# Patient Record
Sex: MALE | URBAN - METROPOLITAN AREA
[De-identification: names, ages, dates, MRNs, and addresses within clinical notes are randomized per-mention and may not be internally consistent; named-entity substitution may affect disease eponyms.]

---

## 2021-01-04 ENCOUNTER — VIRTUAL VISIT (OUTPATIENT)
Dept: SLEEP MEDICINE | Age: 31
End: 2021-01-04
Payer: MEDICAID

## 2021-01-04 VITALS — BODY MASS INDEX: 26.04 KG/M2 | HEIGHT: 71 IN | WEIGHT: 186 LBS

## 2021-01-04 DIAGNOSIS — G47.33 OSA (OBSTRUCTIVE SLEEP APNEA): Primary | ICD-10-CM

## 2021-01-04 PROCEDURE — 99204 OFFICE O/P NEW MOD 45 MIN: CPT | Performed by: SPECIALIST

## 2021-01-04 NOTE — PROGRESS NOTES
[address] [place]. [place], [address]  Tel.  [phone]  Fax. [address] [facility] [address]  [place], [address] [facility]  Tel.  [phone]  Fax. [phone] [address] [facility] [address]ju Castaneda   Tel.  [phone]  Fax. [phone]     Magda Frost is a 26 y.o. male who was seen by synchronous (real-time) audio-video technology on 1/4/2021. Consent:  He and/or his healthcare decision maker is aware that this patient-initiated Telehealth encounter is a billable service, with coverage as determined by his insurance carrier. He is aware that he may receive a bill and has provided verbal consent to proceed: Yes    I was in the office while conducting this encounter. Chief Complaint       Chief Complaint   Patient presents with    Sleep Problem     NP self refd  - send link to [phone]        [facility]      Magda Frost is 26 y.o. male seen for evaluation of a sleep disorder. He states he was in his usual state of health until several days ago when he found that he was unable to fall asleep supine. He felt that his \"throat was closing\". He was awake at that time. At sleep onset he reported that he had a brief jerking \"could not exhale\". He reports a history of snoring and vivid dreaming. He normally retires between 46 AM and awakens between 15 and 2 PM.    The patient has not undergone diagnostic testing for the current problems. [place] Sleepiness Score: 8       Allergies   Allergen Reactions    Cat Dander Itching            He  has no past medical history on file. He  has no past surgical history on file. He family history is not on file. He  reports that he has never smoked. He has never used smokeless tobacco. He reports current drug use. Drug: Marijuana. He reports that he does not drink alcohol. Review of Systems:  Review of Systems   Constitutional: Negative for chills and fever. HENT: Negative for hearing loss and tinnitus. Eyes: Negative for blurred vision and double vision. Respiratory: Negative for cough and shortness of breath. Cardiovascular: Negative for palpitations. Gastrointestinal: Positive for heartburn. Genitourinary: Negative for frequency and urgency. Musculoskeletal: Negative for back pain and neck pain. Skin: Negative for itching and rash. Neurological: Negative for dizziness and headaches. Psychiatric/Behavioral: Positive for substance abuse (notes Miscellaneous drug use). Negative for depression. Objective:     Visit Vitals  Ht 5' 11\" (1.803 m)   Wt 186 lb (84.4 kg)   BMI 25.94 kg/m²     Body mass index is 25.94 kg/m². General:   Conversant, cooperative   Eyes:   no nystagmus   Oropharynx:   Mallampati score I                   Skin:   no obvious rashes   Neuro:  Speech fluent, face symmetrical, tongue movement normal   Psych:  Normal affect,  normal countenance        Assessment:       ICD-10-CM ICD-9-CM    1. CY (obstructive sleep apnea)  G47.33 327.23        Recent onset of supine symptoms, while awake, not suggestive of sleep disordered breathing. Patient notes currently he is in [place]. I have suggested he be seen by primary care given the history. When he returns to [place] could proceed with evaluation for potential sleep disordered breathing if symptoms persist.  Otherwise, he may benefit from sleep medicine evaluation at his current location. Plan:     No orders of the defined types were placed in this encounter. * He was provided information on sleep apnea including corresponding risk factors and the importance of proper treatment. * Treatment options if indicated were reviewed today. Instructions:  o Do not engage in activities requiring a normal degree of alertness if fatigue is present.   o The patient understands that untreated or undertreated sleep apnea could impair judgement and the ability to function normally during the day.  o Call or return if symptoms worsen or persist.          Charisma Luther MD, [facility]  Electronically signed 01/04/21     Pursuant to the emergency declaration under the [address] [place] [address] waiver authority and the [employer] Act, this Virtual  Visit was conducted, with patient's consent, to reduce the patient's risk of exposure to COVID-19 and provide continuity of care for an established patient. Services were provided through a video synchronous discussion virtually to substitute for in-person clinic visit. Jim Cunningham MD     This note was created using voice recognition software. Despite editing, there may be syntax errors. This note will not be viewable in [address] Greater than 15 minutes was spent: In direct video patient care, planning and chart review.

## 2021-01-04 NOTE — PATIENT INSTRUCTIONS
Learning About Sleep Apnea What is it? Sleep apnea means that breathing stops for short periods during sleep. When you stop breathing or have reduced airflow into your lungs during sleep, you don't sleep well and you can be very tired during the day. The oxygen levels in your blood may go down, and carbon dioxide levels go up. It may lead to other problems, such as high blood pressure and heart disease. Sleep apnea can range from mild to severe, based on how often breathing stops during sleep. Breathing may stop as few as 5 times an hour (mild apnea) to 30 or more times an hour (severe apnea). Obstructive sleep apnea is the most common type. This most often occurs because your airways are blocked or partly blocked. Central sleep apnea is less common. It happens when the brain has trouble controlling breathing. Some people have both types. That's called complex sleep apnea. What are the symptoms? There are symptoms of sleep apnea that you may notice and symptoms that others may notice when you're asleep. Symptoms you may notice include: · Feeling extremely sleepy during the day. · Feeling unrefreshed or tired after a night's sleep. · Problems with memory and concentration, or mood changes. · Morning or night headaches. · Heartburn or a sour taste in your mouth at night. · Swelling of the legs. · Getting up often during the night to urinate. · A dry mouth or sore throat in the morning. Your bed partner may notice that you: 
· Have episodes of not breathing. · Snore loudly. Almost all people who have sleep apnea snore. But not all people who snore have sleep apnea. · Toss and turn during sleep. · Have nighttime choking or gasping spells. How is it diagnosed? Your doctor will probably do a physical exam and ask about your past health. He or she may also ask you or your bed partner about your snoring and sleep behavior and how tired you feel during the day. Your doctor may suggest a sleep study. Sleep studies are a series of tests that look at what happens to the body during sleep. They check for how often you stop breathing or have too little air flowing into your lungs during sleep. They also find out how much oxygen you have in your blood during sleep. A sleep study may take place in your home. Or it might take place at a sleep center, where you will spend the night. How is it treated? Sleep apnea is often treated with devices that deliver air through a mask to help keep your airways open. These include: 
· Continuous positive airway pressure (CPAP). This increases air pressure in your throat. It keeps your airway open when you breathe in. It's the most common device. · Bilevel positive airway pressure (BiPAP). This uses different air pressures when you breathe in and out. · Adaptive servo ventilation (ASV). It senses pauses in breathing and adjusts air pressure. It's mostly used for central sleep apnea. If your tonsils or other tissues are blocking your airway, your doctor may suggest surgery to open the airway. How can you care for yourself? You may be able to treat mild sleep apnea by making changes in how you live and the way you sleep. For example, it may help to: 
· Lose weight if you are overweight. · Sleep on your side, not your back. · Avoid alcohol and medicines such as sedatives before bed. You may also try an oral breathing device. It helps keep your airways open while you sleep. Where can you learn more? Go to http://www.gray.com/ Enter S121 in the search box to learn more about \"Learning About Sleep Apnea. \" Current as of: February 24, 2020               Content Version: 12.6 © 7146-8896 [employer]. Care instructions adapted under license by [employer] (which disclaims liability or warranty for this information). If you have questions about a medical condition or this instruction, always ask your healthcare professional. [address] any warranty or liability for your use of this information.

## 2021-01-11 ENCOUNTER — TELEPHONE (OUTPATIENT)
Dept: SLEEP MEDICINE | Age: 31
End: 2021-01-11

## 2021-01-11 DIAGNOSIS — G47.33 OSA (OBSTRUCTIVE SLEEP APNEA): Primary | ICD-10-CM

## 2021-01-11 NOTE — TELEPHONE ENCOUNTER
Patient mom Raj Riggins)  phoned the office to let us know Bailey Morning will be available for the follow up phone karla/ darnell 1/12/21 after 2pm.

## 2021-01-11 NOTE — TELEPHONE ENCOUNTER
Received message to call Mrs. Sarah Naqvi (on PHI). Voicemail left. Will make 2nd attempt before end-of-business today.

## 2021-01-11 NOTE — TELEPHONE ENCOUNTER
Talked to mom, Morgan Natasha Anahy (BB) Robert Chavarria (on [address]). Verified patient's identity by name and . After expressing concerns, will have Mrs. Scott Wray NP extend a follow-up call to patient at [phone]. He currently is working in [place] and mom will let him know to expect a call from our office either today or sometime tomorrow afternoon.

## 2021-01-12 NOTE — TELEPHONE ENCOUNTER
Patient last seen by Dr. Francis Cheng 1/4/2020, prior notes reviewed in detail. Patient was out of state, residing in [place] at time of virtual visit. He had reported change in usual state of health noting \"he was unable to fall asleep supine\". Patient was advised to follow up with PCP regarding acute change in health and encouraged to follow up with sleep medicine when he returned to [place] if sleep symptoms continued. Called patient to follow up. He clarified that sleep symptoms are not new onset, but that they have been worsening and this had culminated recently in the inability to sustain sleep due to waking up choking and gasping while falling asleep. He has since adjusted sleep position and is now able to fall asleep but continues to have other symptoms. He has been [facility] and has not been to see a PCP since in [place]. He is not having daytime symptoms of shortness of breath or problems breathing currently, he reports no other health issues or medications. He reports he has had moderate daytime sleepiness for a number of year (s) and it has stayed the same, he has been taking occasional naps during the day. 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 The severity of the day time fatigue has not impacted the ability to function normally during the day. He reports he has been snoring for a number of years and his snoring has worsened. He avoids sleeping supine due to difficulty breathing comfortably in this position. He reports waking up with a swollen uvula and sore throat at times. He has not noted problems with concentration and memory, he reports he has experienced non-restorative sleep, early morning headaches and waking up from snort/gasp. The patient notes he retires at 4 am and awakens at 12 pm and that he will experience awakening from sleep.  In general he is able to return to sleep after awakening, he tends to awaken spontaneously. The patient has not undergone diagnostic testing for the current problems. He is interested in sleep apnea testing given family history of sleep apnea and concerns for swollen uvula and snoring. The patient currently has a Moderate Risk for having sleep apnea. STOP-BANG score 3. We discussed Home Sleep testing for initial evaluation if patient is able to return to [facility] for testing. He will trial weight loss and positional therapy and contact our office regarding when he will be able to complete sleep testing.       Philip Valdivia NP, [facility]  01/12/21 .